# Patient Record
Sex: MALE | Race: WHITE
[De-identification: names, ages, dates, MRNs, and addresses within clinical notes are randomized per-mention and may not be internally consistent; named-entity substitution may affect disease eponyms.]

---

## 2021-04-21 ENCOUNTER — RX ONLY (OUTPATIENT)
Age: 56
Setting detail: RX ONLY
End: 2021-04-21

## 2021-04-21 ENCOUNTER — APPOINTMENT (RX ONLY)
Dept: URBAN - METROPOLITAN AREA CLINIC 90 | Facility: CLINIC | Age: 56
Setting detail: DERMATOLOGY
End: 2021-04-21

## 2021-04-21 DIAGNOSIS — L21.8 OTHER SEBORRHEIC DERMATITIS: ICD-10-CM | Status: INADEQUATELY CONTROLLED

## 2021-04-21 PROCEDURE — ? COUNSELING

## 2021-04-21 PROCEDURE — ? FULL BODY SKIN EXAM - DECLINED

## 2021-04-21 PROCEDURE — ? PRESCRIPTION

## 2021-04-21 PROCEDURE — ? ADDITIONAL NOTES

## 2021-04-21 PROCEDURE — 99204 OFFICE O/P NEW MOD 45 MIN: CPT

## 2021-04-21 PROCEDURE — ? PRESCRIPTION MEDICATION MANAGEMENT

## 2021-04-21 RX ORDER — KETOCONAZOLE 20.5 MG/ML
SHAMPOO, SUSPENSION TOPICAL TIW
Qty: 1 | Refills: 4 | Status: ERX

## 2021-04-21 RX ORDER — KETOCONAZOLE 20 MG/G
CREAM TOPICAL
Qty: 1 | Refills: 3 | Status: ERX | COMMUNITY
Start: 2021-04-21

## 2021-04-21 RX ORDER — KETOCONAZOLE 20 MG/G
CREAM TOPICAL
Qty: 1 | Refills: 3 | Status: ERX

## 2021-04-21 RX ORDER — KETOCONAZOLE 20.5 MG/ML
SHAMPOO, SUSPENSION TOPICAL TIW
Qty: 1 | Refills: 4 | Status: ERX | COMMUNITY
Start: 2021-04-21

## 2021-04-21 RX ADMIN — KETOCONAZOLE: 20.5 SHAMPOO, SUSPENSION TOPICAL at 00:00

## 2021-04-21 RX ADMIN — KETOCONAZOLE: 20 CREAM TOPICAL at 00:00

## 2021-04-21 ASSESSMENT — LOCATION DETAILED DESCRIPTION DERM
LOCATION DETAILED: RIGHT INFERIOR MEDIAL MALAR CHEEK
LOCATION DETAILED: GLABELLA
LOCATION DETAILED: NASAL TIP
LOCATION DETAILED: LEFT MEDIAL MALAR CHEEK
LOCATION DETAILED: RIGHT NASAL ALA
LOCATION DETAILED: LEFT UPPER CUTANEOUS LIP
LOCATION DETAILED: RIGHT SUPERIOR VERMILION LIP
LOCATION DETAILED: LEFT LATERAL EYEBROW
LOCATION DETAILED: LEFT SUPERIOR FOREHEAD
LOCATION DETAILED: RIGHT SUPERIOR MEDIAL FOREHEAD
LOCATION DETAILED: RIGHT MEDIAL MALAR CHEEK

## 2021-04-21 ASSESSMENT — LOCATION SIMPLE DESCRIPTION DERM
LOCATION SIMPLE: RIGHT LIP
LOCATION SIMPLE: LEFT EYEBROW
LOCATION SIMPLE: LEFT CHEEK
LOCATION SIMPLE: NOSE
LOCATION SIMPLE: LEFT LIP
LOCATION SIMPLE: RIGHT FOREHEAD
LOCATION SIMPLE: RIGHT CHEEK
LOCATION SIMPLE: GLABELLA
LOCATION SIMPLE: LEFT FOREHEAD
LOCATION SIMPLE: RIGHT NOSE

## 2021-04-21 ASSESSMENT — LOCATION ZONE DERM
LOCATION ZONE: FACE
LOCATION ZONE: NOSE
LOCATION ZONE: LIP
LOCATION ZONE: LIP
LOCATION ZONE: FACE

## 2021-04-21 ASSESSMENT — SEVERITY ASSESSMENT: HOW SEVERE IS THIS PATIENT'S CONDITION?: MODERATE

## 2021-04-21 NOTE — PROCEDURE: ADDITIONAL NOTES
Render Risk Assessment In Note?: no
Detail Level: Simple
Additional Notes: Seborrheic Dermatitis treatment goal NOT MET: reduction of inflammation, irritation and associated scaling. \\n\\nTX options discussed:  topical steroids, topical anti fungals, cleansers/shampoos

## 2021-04-21 NOTE — PROCEDURE: PRESCRIPTION MEDICATION MANAGEMENT
Detail Level: Zone
Initiate Treatment: ketoconazole 2 % shampoo BIW\\n\\nSig: Shampoo 1-2 times weekly to scalp/face. Leave on 5-10 minutes before rinsing (avoid eyes)\\n\\nketoconazole 2 % topical cream \\nSig: Apply to rash on face BID x 2 weeks.
Render In Strict Bullet Format?: No